# Patient Record
(demographics unavailable — no encounter records)

---

## 2025-01-30 NOTE — ASSESSMENT
[FreeTextEntry1] : 86 y/o CAD, s/p 3-vessel PCI.  HTN uncontrolled again due to Na bicarb prescribed by renal. Hyperlipidemia. CKD 3B. Sinus bradycardia has improved. Hyperkalemia has resolved. Valsartan restarted by renal. Unstable angina, now improved. LHC showed stable CAD, 80% ostial D1 stenosis.  Hyponatremia, hyperkalemia have improved.  Plan: Continue treatment. Patient will take extra doses of Hydralazine up to 300 mg a day when BP is elevated. Fluid restriction 1 L/day. Renal f/u scheduled. Patient advised to continue daily walks. Repeat BW pending. F/u in 4 months.  Levar Erwin MD

## 2025-01-30 NOTE — HISTORY OF PRESENT ILLNESS
[FreeTextEntry1] : 86 yo with h/o CAD, 3-vessel PCI with jailed side branches and residual angina. S/p EECP. Class 2 angina on current therapy.   Pt was hospitalized at [facility] in October 2022 Pt was send to ER from nephrology office due to hypertension.  Pt is seen for f.u denies chest pain, no SOB.  Reports B/p in 160th few times per week.   Has insomnia   10/29/22 Lexiscan No evidence for ischemia EF 72 % 10/29/22 TTE EF of 57 %.Grade I diastolic dysfunction   post LHC  revealing:  LM: Minor luminal irregularities. LAD: mild-moderate ISR. Diagonal: 80% stenosis CX: Patent stent. Minor luminal irregularities. OM1: Minor luminal irregularities. RCA: Mild ISR. Mod disease distally. RPDA: Minor luminal irregularities.  1/7/25  Cr 2.27  GFR 28  A1C 4.8  K 4.6  LDL 42

## 2025-01-30 NOTE — REVIEW OF SYSTEMS
[Feeling Fatigued] : feeling fatigued [Chest Discomfort] : chest discomfort [Easy Bruising] : a tendency for easy bruising [Negative] : Neurological [Fever] : no fever [Chills] : no chills [Lower Ext Edema] : no extremity edema [Leg Claudication] : no intermittent leg claudication [Palpitations] : no palpitations [Orthopnea] : no orthopnea [Syncope] : no syncope [Rash] : no rash [Anxiety] : no anxiety [Easy Bleeding] : no tendency for easy bleeding

## 2025-01-30 NOTE — PHYSICAL EXAM
[Well Developed] : well developed [Well Nourished] : well nourished [No Acute Distress] : no acute distress [Normal Conjunctiva] : normal conjunctiva [Normal Venous Pressure] : normal venous pressure [No Carotid Bruit] : no carotid bruit [Normal S1, S2] : normal S1, S2 [No Murmur] : no murmur [No Rub] : no rub [S4] : S4 [Clear Lung Fields] : clear lung fields [Good Air Entry] : good air entry [No Respiratory Distress] : no respiratory distress  [Soft] : abdomen soft [Non Tender] : non-tender [Normal Bowel Sounds] : normal bowel sounds [Normal Gait] : normal gait [No Edema] : no edema [No Cyanosis] : no cyanosis [No Clubbing] : no clubbing [No Varicosities] : no varicosities [Normal PT B/L] : normal PT B/L [No Rash] : no rash [Moves all extremities] : moves all extremities [No Focal Deficits] : no focal deficits [Normal Speech] : normal speech [Alert and Oriented] : alert and oriented [Normal memory] : normal memory

## 2025-05-22 NOTE — ASSESSMENT
[FreeTextEntry1] : 84 y/o CAD, s/p 3-vessel PCI.  HTN is mostly controlled. Hyperlipidemia. CKD 3B. Sinus bradycardia has improved. Hyperkalemia has resolved. Valsartan restarted by renal. Unstable angina, now improved. LHC showed stable CAD, 80% ostial D1 stenosis.  Hyponatremia, hyperkalemia have improved.  Plan: Continue treatment. Patient will take extra doses of Hydralazine up to 300 mg a day when BP is elevated. Fluid restriction 1 L/day. Renal f/u scheduled. Patient advised to continue daily walks. Repeat BW pending. F/u in 4 months.  Levar Erwin MD

## 2025-05-22 NOTE — ASSESSMENT
[FreeTextEntry1] : 86 y/o CAD, s/p 3-vessel PCI.  HTN is mostly controlled. Hyperlipidemia. CKD 3B. Sinus bradycardia has improved. Hyperkalemia has resolved. Valsartan restarted by renal. Unstable angina, now improved. LHC showed stable CAD, 80% ostial D1 stenosis.  Hyponatremia, hyperkalemia have improved.  Plan: Continue treatment. Patient will take extra doses of Hydralazine up to 300 mg a day when BP is elevated. Fluid restriction 1 L/day. Renal f/u scheduled. Patient advised to continue daily walks. Repeat BW pending. F/u in 4 months.  Levar Erwin MD

## 2025-05-22 NOTE — HISTORY OF PRESENT ILLNESS
[FreeTextEntry1] : 86 yo with h/o CAD, 3-vessel PCI with jailed side branches and residual angina. S/p EECP. Class 2 angina on current therapy.   Pt was hospitalized at [facility] in October 2022 Pt was send to ER from nephrology office due to hypertension.  Pt is seen for f.u denies chest pain, no SOB.  Reports B/p in 150th   Has insomnia.  S/p Facial skin Ca surgery  Had Nephrology f.u on Epogen inj   10/29/22 Lexiscan No evidence for ischemia EF 72 % 10/29/22 TTE EF of 57 %.Grade I diastolic dysfunction   post LHC  revealing:  LM: Minor luminal irregularities. LAD: mild-moderate ISR. Diagonal: 80% stenosis CX: Patent stent. Minor luminal irregularities. OM1: Minor luminal irregularities. RCA: Mild ISR. Mod disease distally. RPDA: Minor luminal irregularities.  5/9/25  Cr 2.38 GFR 26  A1C 5.2  K 5.3 Chol 139 Trig 55 LDL 52 on ros 20mg

## 2025-05-22 NOTE — HISTORY OF PRESENT ILLNESS
[FreeTextEntry1] : 84 yo with h/o CAD, 3-vessel PCI with jailed side branches and residual angina. S/p EECP. Class 2 angina on current therapy.   Pt was hospitalized at [facility] in October 2022 Pt was send to ER from nephrology office due to hypertension.  Pt is seen for f.u denies chest pain, no SOB.  Reports B/p in 150[address]   Has insomnia.  S/p Facial skin Ca surgery  Had Nephrology f.u on Epogen inj   10/29/22 Lexiscan No evidence for ischemia EF 72 % 10/29/22 TTE EF of 57 %.Grade I diastolic dysfunction   post LHC  revealing:  LM: Minor luminal irregularities. LAD: mild-moderate ISR. Diagonal: 80% stenosis CX: Patent stent. Minor luminal irregularities. OM1: Minor luminal irregularities. RCA: Mild ISR. Mod disease distally. RPDA: Minor luminal irregularities.  5/9/25  Cr 2.38 GFR 26  A1C 5.2  K 5.3 Chol 139 Trig 55 LDL 52 on ros 20mg